# Patient Record
Sex: FEMALE | Race: WHITE | URBAN - METROPOLITAN AREA
[De-identification: names, ages, dates, MRNs, and addresses within clinical notes are randomized per-mention and may not be internally consistent; named-entity substitution may affect disease eponyms.]

---

## 2019-01-04 ENCOUNTER — NEW PATIENT (OUTPATIENT)
Dept: URBAN - METROPOLITAN AREA CLINIC 10 | Facility: CLINIC | Age: 69
End: 2019-01-04
Payer: MEDICARE

## 2019-01-04 DIAGNOSIS — H43.393 OTHER VITREOUS OPACITIES, BILATERAL: ICD-10-CM

## 2019-01-04 PROCEDURE — 92134 CPTRZ OPH DX IMG PST SGM RTA: CPT | Performed by: OPHTHALMOLOGY

## 2019-01-04 PROCEDURE — 92004 COMPRE OPH EXAM NEW PT 1/>: CPT | Performed by: OPHTHALMOLOGY

## 2019-01-04 PROCEDURE — 92133 CPTRZD OPH DX IMG PST SGM ON: CPT | Performed by: OPHTHALMOLOGY

## 2019-01-04 ASSESSMENT — KERATOMETRY
OS: 44.88
OD: 45.25

## 2019-01-04 ASSESSMENT — INTRAOCULAR PRESSURE
OS: 16
OD: 16

## 2019-01-04 ASSESSMENT — VISUAL ACUITY
OD: 20/25
OS: 20/20

## 2019-02-15 ENCOUNTER — Encounter (OUTPATIENT)
Dept: URBAN - METROPOLITAN AREA CLINIC 10 | Facility: CLINIC | Age: 69
End: 2019-02-15
Payer: MEDICARE

## 2019-02-15 DIAGNOSIS — Z01.818 ENCOUNTER FOR OTHER PREPROCEDURAL EXAMINATION: Primary | ICD-10-CM

## 2019-02-15 PROCEDURE — 99213 OFFICE O/P EST LOW 20 MIN: CPT | Performed by: PHYSICIAN ASSISTANT

## 2019-02-21 ENCOUNTER — FOLLOW UP ESTABLISHED (OUTPATIENT)
Dept: URBAN - METROPOLITAN AREA CLINIC 10 | Facility: CLINIC | Age: 69
End: 2019-02-21
Payer: MEDICARE

## 2019-02-21 DIAGNOSIS — H25.813 COMBINED FORMS OF AGE-RELATED CATARACT, BILATERAL: Primary | ICD-10-CM

## 2019-02-21 DIAGNOSIS — H52.223 REGULAR ASTIGMATISM, BILATERAL: ICD-10-CM

## 2019-02-21 PROCEDURE — 99213 OFFICE O/P EST LOW 20 MIN: CPT | Performed by: OPHTHALMOLOGY

## 2019-02-21 ASSESSMENT — INTRAOCULAR PRESSURE
OD: 16
OS: 16

## 2019-02-28 ENCOUNTER — SURGERY (OUTPATIENT)
Dept: URBAN - METROPOLITAN AREA SURGERY 5 | Facility: SURGERY | Age: 69
End: 2019-02-28
Payer: MEDICARE

## 2019-02-28 PROCEDURE — 66984 XCAPSL CTRC RMVL W/O ECP: CPT | Performed by: OPHTHALMOLOGY

## 2019-03-01 ENCOUNTER — POST OP (OUTPATIENT)
Dept: URBAN - METROPOLITAN AREA CLINIC 10 | Facility: CLINIC | Age: 69
End: 2019-03-01

## 2019-03-01 PROCEDURE — 99024 POSTOP FOLLOW-UP VISIT: CPT | Performed by: OPTOMETRIST

## 2019-03-01 ASSESSMENT — INTRAOCULAR PRESSURE: OD: 17

## 2019-03-08 ENCOUNTER — POST OP (OUTPATIENT)
Dept: URBAN - METROPOLITAN AREA CLINIC 10 | Facility: CLINIC | Age: 69
End: 2019-03-08

## 2019-03-08 PROCEDURE — 99024 POSTOP FOLLOW-UP VISIT: CPT | Performed by: OPTOMETRIST

## 2019-03-08 ASSESSMENT — INTRAOCULAR PRESSURE
OD: 12
OS: 14

## 2019-03-08 ASSESSMENT — VISUAL ACUITY
OS: 20/20
OD: 20/20

## 2019-03-14 ENCOUNTER — SURGERY (OUTPATIENT)
Dept: URBAN - METROPOLITAN AREA SURGERY 5 | Facility: SURGERY | Age: 69
End: 2019-03-14
Payer: MEDICARE

## 2019-03-14 PROCEDURE — 66984 XCAPSL CTRC RMVL W/O ECP: CPT | Performed by: OPHTHALMOLOGY

## 2019-03-15 ENCOUNTER — POST OP (OUTPATIENT)
Dept: URBAN - METROPOLITAN AREA CLINIC 10 | Facility: CLINIC | Age: 69
End: 2019-03-15

## 2019-03-15 DIAGNOSIS — Z96.1 PRESENCE OF INTRAOCULAR LENS: Primary | ICD-10-CM

## 2019-03-15 PROCEDURE — 99024 POSTOP FOLLOW-UP VISIT: CPT | Performed by: OPTOMETRIST

## 2019-03-15 ASSESSMENT — INTRAOCULAR PRESSURE: OS: 20

## 2019-03-22 ENCOUNTER — POST OP (OUTPATIENT)
Dept: URBAN - METROPOLITAN AREA CLINIC 10 | Facility: CLINIC | Age: 69
End: 2019-03-22

## 2019-03-22 PROCEDURE — 99024 POSTOP FOLLOW-UP VISIT: CPT | Performed by: OPTOMETRIST

## 2019-03-22 ASSESSMENT — VISUAL ACUITY
OD: 20/20
OS: 20/20

## 2019-03-22 ASSESSMENT — INTRAOCULAR PRESSURE
OD: 13
OS: 13

## 2019-04-05 ENCOUNTER — POST OP (OUTPATIENT)
Dept: URBAN - METROPOLITAN AREA CLINIC 10 | Facility: CLINIC | Age: 69
End: 2019-04-05

## 2019-04-05 PROCEDURE — 99024 POSTOP FOLLOW-UP VISIT: CPT | Performed by: OPTOMETRIST

## 2019-04-05 ASSESSMENT — INTRAOCULAR PRESSURE
OD: 11
OS: 15

## 2019-04-05 ASSESSMENT — VISUAL ACUITY
OS: 20/15
OD: 20/15

## 2020-01-31 ENCOUNTER — APPOINTMENT (RX ONLY)
Dept: URBAN - METROPOLITAN AREA CLINIC 166 | Facility: CLINIC | Age: 70
Setting detail: DERMATOLOGY
End: 2020-01-31

## 2020-01-31 DIAGNOSIS — D69.2 OTHER NONTHROMBOCYTOPENIC PURPURA: ICD-10-CM

## 2020-01-31 DIAGNOSIS — L81.0 POSTINFLAMMATORY HYPERPIGMENTATION: ICD-10-CM

## 2020-01-31 PROBLEM — I10 ESSENTIAL (PRIMARY) HYPERTENSION: Status: ACTIVE | Noted: 2020-01-31

## 2020-01-31 PROCEDURE — ? OTHER

## 2020-01-31 PROCEDURE — ? TREATMENT REGIMEN

## 2020-01-31 PROCEDURE — 99202 OFFICE O/P NEW SF 15 MIN: CPT

## 2020-01-31 PROCEDURE — ? PHOTO-DOCUMENTATION

## 2020-01-31 PROCEDURE — ? COUNSELING

## 2020-01-31 ASSESSMENT — LOCATION SIMPLE DESCRIPTION DERM
LOCATION SIMPLE: RIGHT PRETIBIAL REGION
LOCATION SIMPLE: RIGHT FOREARM
LOCATION SIMPLE: LEFT FOREARM
LOCATION SIMPLE: LEFT PRETIBIAL REGION

## 2020-01-31 ASSESSMENT — LOCATION DETAILED DESCRIPTION DERM
LOCATION DETAILED: LEFT DISTAL PRETIBIAL REGION
LOCATION DETAILED: LEFT PROXIMAL DORSAL FOREARM
LOCATION DETAILED: RIGHT PROXIMAL DORSAL FOREARM
LOCATION DETAILED: RIGHT DISTAL PRETIBIAL REGION

## 2020-01-31 ASSESSMENT — LOCATION ZONE DERM
LOCATION ZONE: ARM
LOCATION ZONE: LEG

## 2020-01-31 NOTE — PROCEDURE: OTHER
Detail Level: Zone
Note Text (......Xxx Chief Complaint.): This diagnosis correlates with the
Other (Free Text): Offered blood work, patient declined. Will follow up with PCP\\n\\nI explained that the post-inflammatory changes on the lower legs can take months to fade and sometimes persist chronically. Pt understands.

## 2020-01-31 NOTE — PROCEDURE: TREATMENT REGIMEN
Plan: Keep skin moisturized\\nCareful not to bump into things\\nCompression stocking, elevate legs,exercise, pump legs\\n\\nReturn to clinic if bruises increase or appear in sites without trauma.
Detail Level: Simple
Samples Given: Dermamend (arnica) lotion daily

## 2020-02-28 ENCOUNTER — FOLLOW UP ESTABLISHED (OUTPATIENT)
Dept: URBAN - METROPOLITAN AREA CLINIC 10 | Facility: CLINIC | Age: 70
End: 2020-02-28
Payer: MEDICARE

## 2020-02-28 PROCEDURE — 92014 COMPRE OPH EXAM EST PT 1/>: CPT | Performed by: OPTOMETRIST

## 2020-02-28 PROCEDURE — 92134 CPTRZ OPH DX IMG PST SGM RTA: CPT | Performed by: OPTOMETRIST

## 2020-02-28 ASSESSMENT — VISUAL ACUITY
OD: 20/20
OS: 20/20

## 2020-02-28 ASSESSMENT — INTRAOCULAR PRESSURE
OD: 14
OS: 15

## 2020-07-17 ENCOUNTER — FOLLOW UP ESTABLISHED (OUTPATIENT)
Dept: URBAN - METROPOLITAN AREA CLINIC 10 | Facility: CLINIC | Age: 70
End: 2020-07-17
Payer: MEDICARE

## 2020-07-17 DIAGNOSIS — H26.493 OTHER SECONDARY CATARACT, BILATERAL: Primary | ICD-10-CM

## 2020-07-17 PROCEDURE — 92014 COMPRE OPH EXAM EST PT 1/>: CPT | Performed by: OPTOMETRIST

## 2020-07-17 ASSESSMENT — VISUAL ACUITY
OD: 20/20
OS: 20/20

## 2020-07-17 ASSESSMENT — INTRAOCULAR PRESSURE
OD: 15
OS: 15

## 2020-07-24 ENCOUNTER — Encounter (OUTPATIENT)
Dept: URBAN - METROPOLITAN AREA CLINIC 10 | Facility: CLINIC | Age: 70
End: 2020-07-24
Payer: MEDICARE

## 2020-07-24 PROCEDURE — 99213 OFFICE O/P EST LOW 20 MIN: CPT | Performed by: PHYSICIAN ASSISTANT

## 2020-07-30 ENCOUNTER — SURGERY (OUTPATIENT)
Dept: URBAN - METROPOLITAN AREA SURGERY 5 | Facility: SURGERY | Age: 70
End: 2020-07-30
Payer: MEDICARE

## 2020-07-30 PROCEDURE — 66821 AFTER CATARACT LASER SURGERY: CPT | Performed by: OPHTHALMOLOGY

## 2020-08-06 ENCOUNTER — SURGERY (OUTPATIENT)
Dept: URBAN - METROPOLITAN AREA SURGERY 5 | Facility: SURGERY | Age: 70
End: 2020-08-06
Payer: MEDICARE

## 2020-08-06 PROCEDURE — 66821 AFTER CATARACT LASER SURGERY: CPT | Performed by: OPHTHALMOLOGY

## 2021-04-16 ENCOUNTER — OFFICE VISIT (OUTPATIENT)
Dept: URBAN - METROPOLITAN AREA CLINIC 10 | Facility: CLINIC | Age: 71
End: 2021-04-16
Payer: MEDICARE

## 2021-04-16 DIAGNOSIS — H43.813 VITREOUS DEGENERATION, BILATERAL: Primary | ICD-10-CM

## 2021-04-16 PROCEDURE — 92025 CPTRIZED CORNEAL TOPOGRAPHY: CPT | Performed by: OPTOMETRIST

## 2021-04-16 PROCEDURE — 99214 OFFICE O/P EST MOD 30 MIN: CPT | Performed by: OPTOMETRIST

## 2021-04-16 PROCEDURE — 92134 CPTRZ OPH DX IMG PST SGM RTA: CPT | Performed by: OPTOMETRIST

## 2021-04-16 ASSESSMENT — INTRAOCULAR PRESSURE
OD: 12
OS: 12

## 2021-04-16 ASSESSMENT — VISUAL ACUITY
OS: 20/20
OD: 20/20

## 2021-04-16 NOTE — IMPRESSION/PLAN
Impression: Vitreous degeneration, bilateral Plan: There is no evidence of retinal pathology. All symptoms of retinal detachment or tears were discussed in detail. If pt. notices any symptoms discussed, contact office immediately. Recommend pt. return for normal recall. Consider consult for PPV PRN as pt. becoming more symptomatic.

## 2023-04-07 ENCOUNTER — OFFICE VISIT (OUTPATIENT)
Dept: URBAN - METROPOLITAN AREA CLINIC 10 | Facility: CLINIC | Age: 73
End: 2023-04-07
Payer: MEDICARE

## 2023-04-07 DIAGNOSIS — H43.813 VITREOUS DEGENERATION, BILATERAL: Primary | ICD-10-CM

## 2023-04-07 DIAGNOSIS — H35.373 PUCKERING OF MACULA, BILATERAL: ICD-10-CM

## 2023-04-07 PROCEDURE — 99214 OFFICE O/P EST MOD 30 MIN: CPT | Performed by: OPTOMETRIST

## 2023-04-07 PROCEDURE — 92134 CPTRZ OPH DX IMG PST SGM RTA: CPT | Performed by: OPTOMETRIST

## 2023-04-07 ASSESSMENT — INTRAOCULAR PRESSURE
OD: 12
OS: 12

## 2023-04-07 ASSESSMENT — VISUAL ACUITY
OD: 20/20
OS: 20/20